# Patient Record
Sex: FEMALE | Race: ASIAN | NOT HISPANIC OR LATINO | Employment: FULL TIME
[De-identification: names, ages, dates, MRNs, and addresses within clinical notes are randomized per-mention and may not be internally consistent; named-entity substitution may affect disease eponyms.]

---

## 2017-08-12 ENCOUNTER — HEALTH MAINTENANCE LETTER (OUTPATIENT)
Age: 47
End: 2017-08-12

## 2021-05-28 ENCOUNTER — RECORDS - HEALTHEAST (OUTPATIENT)
Dept: ADMINISTRATIVE | Facility: CLINIC | Age: 51
End: 2021-05-28

## 2024-03-04 ENCOUNTER — HOSPITAL ENCOUNTER (EMERGENCY)
Facility: HOSPITAL | Age: 54
Discharge: HOME | End: 2024-03-05
Attending: EMERGENCY MEDICINE | Admitting: EMERGENCY MEDICINE
Payer: COMMERCIAL

## 2024-03-04 DIAGNOSIS — F43.20 ADJUSTMENT DISORDER, UNSPECIFIED TYPE: ICD-10-CM

## 2024-03-04 LAB
ALBUMIN SERPL BCG-MCNC: 3.9 G/DL (ref 3.5–5.2)
ALP SERPL-CCNC: 98 U/L (ref 40–150)
ALT SERPL W P-5'-P-CCNC: 16 U/L (ref 0–50)
ANION GAP SERPL CALCULATED.3IONS-SCNC: 9 MMOL/L (ref 7–15)
APAP SERPL-MCNC: <5 UG/ML (ref 10–30)
AST SERPL W P-5'-P-CCNC: 19 U/L (ref 0–45)
BASOPHILS # BLD AUTO: 0 10E3/UL (ref 0–0.2)
BASOPHILS NFR BLD AUTO: 0 %
BILIRUB DIRECT SERPL-MCNC: <0.2 MG/DL (ref 0–0.3)
BILIRUB SERPL-MCNC: 0.4 MG/DL
BUN SERPL-MCNC: 9.8 MG/DL (ref 6–20)
CALCIUM SERPL-MCNC: 8.7 MG/DL (ref 8.6–10)
CHLORIDE SERPL-SCNC: 106 MMOL/L (ref 98–107)
CREAT SERPL-MCNC: 0.46 MG/DL (ref 0.51–0.95)
DEPRECATED HCO3 PLAS-SCNC: 25 MMOL/L (ref 22–29)
EGFRCR SERPLBLD CKD-EPI 2021: >90 ML/MIN/1.73M2
EOSINOPHIL # BLD AUTO: 0.1 10E3/UL (ref 0–0.7)
EOSINOPHIL NFR BLD AUTO: 1 %
ERYTHROCYTE [DISTWIDTH] IN BLOOD BY AUTOMATED COUNT: 12.3 % (ref 10–15)
ETHANOL SERPL-MCNC: <0.01 G/DL
GLUCOSE SERPL-MCNC: 109 MG/DL (ref 70–99)
HCT VFR BLD AUTO: 41.9 % (ref 35–47)
HGB BLD-MCNC: 13.2 G/DL (ref 11.7–15.7)
IMM GRANULOCYTES # BLD: 0 10E3/UL
IMM GRANULOCYTES NFR BLD: 0 %
INR PPP: 1.05 (ref 0.85–1.15)
LYMPHOCYTES # BLD AUTO: 1.5 10E3/UL (ref 0.8–5.3)
LYMPHOCYTES NFR BLD AUTO: 25 %
MCH RBC QN AUTO: 26.9 PG (ref 26.5–33)
MCHC RBC AUTO-ENTMCNC: 31.5 G/DL (ref 31.5–36.5)
MCV RBC AUTO: 86 FL (ref 78–100)
MONOCYTES # BLD AUTO: 0.4 10E3/UL (ref 0–1.3)
MONOCYTES NFR BLD AUTO: 7 %
NEUTROPHILS # BLD AUTO: 4 10E3/UL (ref 1.6–8.3)
NEUTROPHILS NFR BLD AUTO: 67 %
NRBC # BLD AUTO: 0 10E3/UL
NRBC BLD AUTO-RTO: 0 /100
PLATELET # BLD AUTO: 264 10E3/UL (ref 150–450)
POTASSIUM SERPL-SCNC: 3.5 MMOL/L (ref 3.4–5.3)
PROT SERPL-MCNC: 7.3 G/DL (ref 6.4–8.3)
RBC # BLD AUTO: 4.9 10E6/UL (ref 3.8–5.2)
SODIUM SERPL-SCNC: 140 MMOL/L (ref 135–145)
WBC # BLD AUTO: 6 10E3/UL (ref 4–11)

## 2024-03-04 PROCEDURE — 80076 HEPATIC FUNCTION PANEL: CPT | Performed by: EMERGENCY MEDICINE

## 2024-03-04 PROCEDURE — 82077 ASSAY SPEC XCP UR&BREATH IA: CPT | Performed by: EMERGENCY MEDICINE

## 2024-03-04 PROCEDURE — 85025 COMPLETE CBC W/AUTO DIFF WBC: CPT | Performed by: EMERGENCY MEDICINE

## 2024-03-04 PROCEDURE — 36415 COLL VENOUS BLD VENIPUNCTURE: CPT | Performed by: EMERGENCY MEDICINE

## 2024-03-04 PROCEDURE — 80143 DRUG ASSAY ACETAMINOPHEN: CPT | Performed by: EMERGENCY MEDICINE

## 2024-03-04 PROCEDURE — 99283 EMERGENCY DEPT VISIT LOW MDM: CPT

## 2024-03-04 PROCEDURE — 85610 PROTHROMBIN TIME: CPT | Performed by: EMERGENCY MEDICINE

## 2024-03-04 ASSESSMENT — ACTIVITIES OF DAILY LIVING (ADL)
ADLS_ACUITY_SCORE: 33
ADLS_ACUITY_SCORE: 35

## 2024-03-04 ASSESSMENT — COLUMBIA-SUICIDE SEVERITY RATING SCALE - C-SSRS
2. HAVE YOU ACTUALLY HAD ANY THOUGHTS OF KILLING YOURSELF IN THE PAST MONTH?: NO
1. IN THE PAST MONTH, HAVE YOU WISHED YOU WERE DEAD OR WISHED YOU COULD GO TO SLEEP AND NOT WAKE UP?: NO
6. HAVE YOU EVER DONE ANYTHING, STARTED TO DO ANYTHING, OR PREPARED TO DO ANYTHING TO END YOUR LIFE?: NO

## 2024-03-05 ENCOUNTER — PATIENT OUTREACH (OUTPATIENT)
Dept: CARE COORDINATION | Facility: CLINIC | Age: 54
End: 2024-03-05
Payer: COMMERCIAL

## 2024-03-05 VITALS
BODY MASS INDEX: 30.62 KG/M2 | RESPIRATION RATE: 16 BRPM | DIASTOLIC BLOOD PRESSURE: 86 MMHG | HEART RATE: 63 BPM | SYSTOLIC BLOOD PRESSURE: 136 MMHG | TEMPERATURE: 97.7 F | WEIGHT: 144 LBS | OXYGEN SATURATION: 98 %

## 2024-03-05 PROBLEM — F43.20 ADJUSTMENT DISORDER: Status: ACTIVE | Noted: 2024-03-05

## 2024-03-05 NOTE — ED PROVIDER NOTES
EMERGENCY DEPARTMENT NOTE     Name: Ppee Graham    Age/Sex: 53 year old female   MRN: [id number]   Evaluation Date & Time:  3/4/2024  9:48 PM    PCP:    Carol Steen   ED Provider: Henrry Beal D.O.       CHIEF COMPLAINT    possible tylenol overdose       DIAGNOSIS & DISPOSITION/MEDICAL DECISION MAKING   No diagnosis found.    Pepe Graham is a 53 year old female with relevant past history of headache who presents to the emergency department for evaluation of possible Tylenol overdose.      Medical Decision Making  Patient on exam is afebrile with stable vital signs.  Exam is otherwise within normal limits including abdominal exam which was nontender.  Laboratory evaluation showed nondetectable acetaminophen level and comprehensive metabolic profile, CBC and INR within normal limits.  DEC  is interviewed the patient.  She has had increasing stress both with relationship with her  and was reported lost her job today adding to it.  Patient does not endorse any suicidal ideation or intent.  Event today was felt to be more of a cry for help.  After discussion with the family a safety plan  Discussed and they were comfortable with discharge and they will stay with the patient and monitor any medication use.  Patient is given resources for outpatient mental health services and will also see her primary care physician this week.  Return criteria were discussed and if the patient is feeling overwhelmed or develops any thoughts of self-harm or return to the emergency department.    Interventions:None  Discharge Vital Signs:BP (!) 147/95   Pulse 61   Temp 97.7  F (36.5  C)   Resp 16   Wt 65.3 kg (144 lb)   LMP 11/01/2013   SpO2 99%   BMI 30.62 kg/m       DISPOSITION: Discharge.    Diagnostic studies:  Imaging:  No orders to display      Lab:  Labs Ordered and Resulted from Time of ED Arrival to Time of ED Departure   BASIC METABOLIC PANEL - Abnormal       Result Value    Sodium 140      Potassium 3.5   "    Chloride 106      Carbon Dioxide (CO2) 25      Anion Gap 9      Urea Nitrogen 9.8      Creatinine 0.46 (*)     GFR Estimate >90      Calcium 8.7      Glucose 109 (*)    ACETAMINOPHEN LEVEL - Abnormal    Acetaminophen <5.0 (*)    HEPATIC FUNCTION PANEL - Normal    Protein Total 7.3      Albumin 3.9      Bilirubin Total 0.4      Alkaline Phosphatase 98      AST 19      ALT 16      Bilirubin Direct <0.20     INR - Normal    INR 1.05     ETHYL ALCOHOL LEVEL - Normal    Alcohol ethyl <0.01     CBC WITH PLATELETS AND DIFFERENTIAL    WBC Count 6.0      RBC Count 4.90      Hemoglobin 13.2      Hematocrit 41.9      MCV 86      MCH 26.9      MCHC 31.5      RDW 12.3      Platelet Count 264      % Neutrophils 67      % Lymphocytes 25      % Monocytes 7      % Eosinophils 1      % Basophils 0      % Immature Granulocytes 0      NRBCs per 100 WBC 0      Absolute Neutrophils 4.0      Absolute Lymphocytes 1.5      Absolute Monocytes 0.4      Absolute Eosinophils 0.1      Absolute Basophils 0.0      Absolute Immature Granulocytes 0.0      Absolute NRBCs 0.0                 Triage note reviewed:Pt states taking 3-5 tylenol for a headache today.  Pt's daughter states she found a note stating ,\"don't save me.\" There were also some text messages between daughter and mother that were concerning for SI.  Pt denies any suicidal ideation currently.  Pt's daughter seems to think that patient took more tylenol then she is admitting to.            History:  Supplemental history from: Family Member/Significant Other  External Record(s) reviewed: Most recent available primary care offices every 29 2016 reviewed    Work Up:  Chart documentation includes differential considered and any EKGs or imaging independently interpreted by provider, where specified.  In additional to work up documented, I considered the following work up: None    External consultation:  Discussion of management with another provider: Other: DEC    Complicating " factors:  Care impacted by chronic illness: NA  Care affected by social determinants of health: Access to medical care    Disposition considerations: Discharge. No recommendations on prescription strength medication(s). See documentation for any additional details.    At the conclusion of the encounter I discussed the results of all of the tests and the disposition. The questions were answered. The patient or family acknowledged understanding and was agreeable with the care plan.    TOTAL CRITICAL CARE TIME (EXCLUDING PROCEDURES): Not applicable    PROCEDURES:   None    EMERGENCY DEPARTMENT COURSE   10:19 PM I met with the patient to gather history and to perform my initial exam.  We discussed treatment options and the plan for care while in the Emergency Department, including DEC assessment. Patient and her family are amenable.  11:30 PM Discussed patient with DEC . The patient has had ongoing stress at home with her . She was fired today. The DEC  agrees that today's event was more likely a cry for help than a suicide gesture. They discussed with the patient's family and the patient did not want to pursue out-patient psychiatric service. Her family will take information and try to get her into counseling and therapy. They will stay with her and ensure that she doesn't have access to medications. Safety plan in place for the patient.  11:38 PM Rechecked patient. Discussed plans for discharge, patient and family. She is requesting a work note.    ED INTERVENTIONS   Medications - No data to display    DISCHARGE MEDICATIONS        Review of your medicines        UNREVIEWED medicines. Ask your doctor about these medicines        Dose / Directions   calcium carbonate 500 MG chewable tablet  Commonly known as: TUMS  Used for: Heartburn      Dose: 1 chew tab  Take 1 tablet (500 mg) by mouth 3 times daily as needed for heartburn  Quantity: 90 tablet  Refills: 3     Dextromethorphan-guaiFENesin   MG/5ML Liqd  Used for: Post-viral cough syndrome      Dose: 10 mL  Take 10 mLs by mouth every 4 hours as needed (cough)  Quantity: 236 mL  Refills: 1     docusate sodium 100 MG tablet  Commonly known as: COLACE  Used for: Constipation, unspecified constipation type      Dose: 100 mg  Take 100 mg by mouth 2 times daily as needed for constipation  Quantity: 60 tablet  Refills: 1     guaiFENesin-codeine 100-10 MG/5ML solution  Commonly known as: ROBITUSSIN AC  Used for: Cough      Dose: 2 tsp.  Take 10 mLs by mouth every 6 hours as needed for cough  Quantity: 120 mL  Refills: 0     ibuprofen 600 MG tablet  Commonly known as: ADVIL/MOTRIN  Used for: Headache(784.0)      Dose: 600 mg  Take 1 tablet (600 mg) by mouth every 6 hours as needed for moderate pain  Quantity: 120 tablet  Refills: 0     TYLENOL PO  Used for: Cough      Dose: 4 tablet  Take 4 tablets by mouth 4 tabs once a day  Refills: 0                INFORMATION SOURCE AND LIMITATIONS    History/Exam limitations: None  Patient information was obtained from: Patient and her daughter  Use of : N/A    HISTORY OF PRESENT ILLNESS   Pepe Graham is a 53 year old year old female with a relevant past history of headache, who presents to this ED by walk in for evaluation of possible Tylenol overdose.    The patient states that she took 3-5 Tylenol today. Her family is concerned about possible suicidal gesture. The patient denies current suicidal ideation. She states that she was feeling frustrated with work and family earlier today.    She denies vomiting or abdominal pain.    REVIEW OF SYSTEMS:   All other systems reviewed and are negative except as noted above in HPI.    PATIENT HISTORY     Past Medical History:   Diagnosis Date    No pertinent past medical history      Patient Active Problem List   Diagnosis    Health Care Home    Headache    Female stress incontinence     Past Surgical History:   Procedure Laterality Date    NO HISTORY OF SURGERY         No  Known Allergies    OUTPATIENT MEDICATIONS     New Prescriptions    No medications on file      Vitals:    03/04/24 2138 03/04/24 2253   BP: (!) 150/96 (!) 147/95   Pulse: 61 61   Resp: 16    Temp: 97.7  F (36.5  C)    SpO2: 99% 99%   Weight: 65.3 kg (144 lb)        Physical Exam   Constitutional: Oriented to person, place, and time. Appears well-developed and well-nourished.   Cardiovascular: Normal rate, regular rhythm and normal heart sounds.    Pulmonary/Chest: Normal effort  and breath sounds normal.   Abdominal: Soft. Bowel sounds are normal.   Neurological: Alert and oriented to person, place, and time. Normal strength. Skin: Skin is warm and dry.   Psychiatric: Normal mood and affect. Behavior is normal. Thought content normal.     DIAGNOSTICS    LABORATORY FINDINGS (REVIEWED AND INTERPRETED):  Labs Ordered and Resulted from Time of ED Arrival to Time of ED Departure   BASIC METABOLIC PANEL - Abnormal       Result Value    Sodium 140      Potassium 3.5      Chloride 106      Carbon Dioxide (CO2) 25      Anion Gap 9      Urea Nitrogen 9.8      Creatinine 0.46 (*)     GFR Estimate >90      Calcium 8.7      Glucose 109 (*)    ACETAMINOPHEN LEVEL - Abnormal    Acetaminophen <5.0 (*)    HEPATIC FUNCTION PANEL - Normal    Protein Total 7.3      Albumin 3.9      Bilirubin Total 0.4      Alkaline Phosphatase 98      AST 19      ALT 16      Bilirubin Direct <0.20     INR - Normal    INR 1.05     ETHYL ALCOHOL LEVEL - Normal    Alcohol ethyl <0.01     CBC WITH PLATELETS AND DIFFERENTIAL    WBC Count 6.0      RBC Count 4.90      Hemoglobin 13.2      Hematocrit 41.9      MCV 86      MCH 26.9      MCHC 31.5      RDW 12.3      Platelet Count 264      % Neutrophils 67      % Lymphocytes 25      % Monocytes 7      % Eosinophils 1      % Basophils 0      % Immature Granulocytes 0      NRBCs per 100 WBC 0      Absolute Neutrophils 4.0      Absolute Lymphocytes 1.5      Absolute Monocytes 0.4      Absolute Eosinophils 0.1       Absolute Basophils 0.0      Absolute Immature Granulocytes 0.0      Absolute NRBCs 0.0           IMAGING (REVIEWED AND INTERPRETED):  No orders to display         I, Juan Miguel Davis, am serving as a scribe to document services personally performed by Henrry Beal D.O., based on my observation and the provider s statements to me.    I, Henrry Beal D.O., attest that Juan Miguel Davis is acting in a scribe capacity, has observed my performance of the services and has documented them in accordance with my direction.    Henrry Beal D.O.  EMERGENCY MEDICINE   03/04/24  [facility] EMERGENCY DEPARTMENT  [address] [place] [id number] [phone]  Dept: [phone]     Henrry Beal DO  03/04/24 9202

## 2024-03-05 NOTE — ED TRIAGE NOTES
"Pt states taking 3-5 tylenol for a headache today.  Pt's daughter states she found a note stating ,\"don't save me.\" There were also some text messages between daughter and mother that were concerning for SI.  Pt denies any suicidal ideation currently.  Pt's daughter seems to think that patient took more tylenol then she is admitting to.        "

## 2024-03-05 NOTE — PROGRESS NOTES
Clinic Care Coordination Contact  Follow Up Progress Note      Assessment: The pt was recently in the ED, I called to check up on the pt and help the pt setup a ED follow up. The pt was at [facility] for possibly tylenol overdose. I called and talked to the pt, pt stated that she is doing better. She did not feel that she needs a follow up.     Care Gaps:    Health Maintenance Due   Topic Date Due    YEARLY PREVENTIVE VISIT  Never done    ADVANCE CARE PLANNING  Never done    MAMMO SCREENING  Never done    COLORECTAL CANCER SCREENING  Never done    HIV SCREENING  Never done    HEPATITIS C SCREENING  Never done    HEPATITIS B IMMUNIZATION (1 of 3 - 19+ 3-dose series) Never done    DTAP/TDAP/TD IMMUNIZATION (1 - Tdap) 02/22/2008    LIPID  Never done    PAP  12/30/2016    ZOSTER IMMUNIZATION (1 of 2) Never done    INFLUENZA VACCINE (1) Never done    COVID-19 Vaccine (1 - 2023-24 season) Never done    PHQ-2 (once per calendar year)  01/01/2024           Care Plans      Intervention/Education provided during outreach:               Plan:     Care Coordinator will follow up in

## 2024-03-05 NOTE — CONSULTS
"Diagnostic Evaluation Consultation  Crisis Assessment    Patient Name: Pepe Graham  Age:  53 year old  Legal Sex: female  Gender Identity: female  Pronouns:   Race:   Ethnicity: Not  or   Language: English      Patient was assessed: Virtual: [facility] Crisis Assessment Start Time: 2255 Crisis Assessment Stop Time: 2325  Patient location: [facility] EMERGENCY DEPARTMENT                                 Referral Data and Chief Complaint  Pepe Graham presents to the ED with family/friends. Patient is presenting to the ED for the following concerns: Suicidal ideation, Depression.   Factors that make the mental health crisis life threatening or complex are:  Pt presents for suicide ideation and concerns that she attemtped to kill herself. Pt reports she has been stressed lately due to a recent demotion at work and a previous \"friend that is a girl has been calling him.\" Pt reports she struggled with sleep yesterday and had a headache when she got home today. Pt reports she got upset about the other woman, thinking she and her  were having an affair. Pt reports her  denied this. Pt reports due to her headache, she took \"a few ibuprofen, not a lot, like 3-5 pills\" to help with her headache. Pt reports she has been emotional today due to the stress and lack of sleep. Pt reports she was having some thoughts of hurting herself and reports she left a note saying, \"don't save me.\" Pt is unclear about whether this was a suicide attempt or not. Pt reports, \"I didn't mean to kill myself, I would've taken a whole bottle. I wrote the note just because I was pissed about the issues. I wasn't trying to kill myself.\" Pt reports she believes this was an act of \"wanting more attention from my , a cry for help.\" Pt denies current SI/SIB/SA/HI and denies plans, means, or intent to harm herself or others. Pt denies hallucinations and delusions. Pt presents as oriented, engaged, and " anxious..      Informed Consent and Assessment Methods  Explained the crisis assessment process, including applicable information disclosures and limits to confidentiality, assessed understanding of the process, and obtained consent to proceed with the assessment.  Assessment methods included conducting a formal interview with patient, review of medical records, collaboration with medical staff, and obtaining relevant collateral information from family and community providers when available.  : done     Patient response to interventions: acceptance expressed, verbalizes understanding  Coping skills were attempted to reduce the crisis:  talked to family     History of the Crisis   Pt denies historical nor current mental health or chemical health issues, including: hospitalizations, inpatient stays, programmatic care, treatment, therapy, or medication management. Pt denies history of SI/SIB/SA/HI and denies plans, means, or intent to harm himself or others. Pt denies historical hallucinations or delusions.    Brief Psychosocial History  Family:  , Children yes  Support System:  Children  Employment Status:  employed full-time  Source of Income:  salary/wages  Financial Environmental Concerns:  none  Current Hobbies:  outdoor activities, family functions, care of plants, gardening  Barriers in Personal Life:  lack of time    Significant Clinical History  Current Anxiety Symptoms:  anxious  Current Depression/Trauma:  thoughts of death/suicide  Current Somatic Symptoms:     Current Psychosis/Thought Disturbance:     Current Eating Symptoms:     Chemical Use History:  Alcohol: None  Benzodiazepines: None  Opiates: None  Cocaine: None  Marijuana: None  Other Use: None   Past diagnosis:  No known past diagnosis  Family history:  No known history of mental health or chemical health concerns  Past treatment:  No known formal treatment attempts  Details of most recent treatment:  Pt denies current nor historical  "involvement in mental health services.  Other relevant history:  Pt reports she lives at home with her  and youngest daughter.       Collateral Information  Is there collateral information: Yes     Collateral information name, relationship, phone number:  NOEL BOLES (Daughter) -- -- [phone]    What happened today: Reports her little sister was dropped off at her home today and was saying pt was acting, \"weird\". Reports she went to pt house and observed her being in bed, sobbing, and mumbling.     What is different about patient's functioning: Reports pt appears more tired and exhausted than normal and believes pt struggles to navigate her feelings. Reports pt has never acted like this before and is not sure what pt \"true intentions were behind taking the ibuprofen, \"but it also could have been to harm herself.\"     Concern about alcohol/drug use:      What do you think the patient needs:      Has patient made comments about wanting to kill themselves/others: yes    If d/c is recommended, can they take part in safety/aftercare planning:  yes (reports able to be a support for mother.)    Additional collateral information:        Risk Assessment  [place] Suicide Severity Rating Scale Full Clinical Version:  Suicidal Ideation  Q1 Wish to be Dead (Lifetime): Yes  Q2 Non-Specific Active Suicidal Thoughts (Lifetime): Yes  3. Active Suicidal Ideation with any Methods (Not Plan) Without Intent to Act (Lifetime): No  Q4 Active Suicidal Ideation with Some Intent to Act, Without Specific Plan (Lifetime): No  Q5 Active Suicidal Ideation with Specific Plan and Intent (Lifetime): No  Q6 Suicide Behavior (Lifetime): no     Suicidal Behavior (Lifetime)  Actual Attempt (Lifetime): No  Has subject engaged in non-suicidal self-injurious behavior? (Lifetime): No  Interrupted Attempts (Lifetime): No  Aborted or Self-Interrupted Attempt (Lifetime): No  Preparatory Acts or Behavior (Lifetime): Yes  Total Number of " "Preparatory Acts (Lifetime): 1  Preparatory Acts or Behavior Description (Lifetime): writing a note that says, \"don't save me\"    [place] Suicide Severity Rating Scale Recent:   Suicidal Ideation (Recent)  Q1 Wished to be Dead (Past Month): yes  Q2 Suicidal Thoughts (Past Month): yes  Q3 Suicidal Thought Method: no  Q4 Suicidal Intent without Specific Plan: no  Q5 Suicide Intent with Specific Plan: no  Level of Risk per Screen: low risk  Intensity of Ideation (Recent)  Most Severe Ideation Rating (Past 1 Month): 2  Frequency (Past 1 Month): Less than once a week  Duration (Past 1 Month): Less than 1 hour/some of the time  Controllability (Past 1 Month): Can control thoughts with little difficulty  Deterrents (Past 1 Month): Deterrents definitely stopped you from attempting suicide  Reasons for Ideation (Past 1 Month): Completely to get attention, revenge, or a reaction from others  Suicidal Behavior (Recent)  Actual Attempt (Past 3 Months): No  Has subject engaged in non-suicidal self-injurious behavior? (Past 3 Months): No  Interrupted Attempts (Past 3 Months): No  Aborted or Self-Interrupted Attempt (Past 3 Months): No  Preparatory Acts or Behavior (Past 3 Months): Yes  Total Number of Preparatory Acts (Past 3 Months): 1  Preparatory Acts or Behavior Description (Past 3 Months): writing a note that says, \"don't save me\"    Environmental or Psychosocial Events: work or task failure, challenging interpersonal relationships  Protective Factors: Protective Factors: strong bond to family unit, community support, or employment, responsibilities and duties to others, including pets and children, lives in a responsibly safe and stable environment, able to access care without barriers, constructive use of leisure time, enjoyable activities, resilience    Does the patient have thoughts of harming others? Feels Like Hurting Others: no  Previous Attempt to Hurt Others: no  Is the patient engaging in sexually inappropriate " "behavior?: no    Is the patient engaging in sexually inappropriate behavior?  no        Mental Status Exam   Affect: Appropriate  Appearance: Appropriate  Attention Span/Concentration: Attentive  Eye Contact: Engaged    Fund of Knowledge: Appropriate   Language /Speech Content: Fluent  Language /Speech Volume: Normal  Language /Speech Rate/Productions: Normal  Recent Memory: Intact  Remote Memory: Intact  Mood: Anxious  Orientation to Person: Yes   Orientation to Place: Yes  Orientation to Time of Day: Yes  Orientation to Date: Yes     Situation (Do they understand why they are here?): Yes  Psychomotor Behavior: Normal  Thought Content: Clear  Thought Form: Intact     Mini-Cog Assessment  Number of Words Recalled:    Clock-Drawing Test:     Three Item Recall:    Mini-Cog Total Score:       Medication  Psychotropic medications:   Medication Orders - Psychiatric (From admission, onward)      None             Current Care Team  Patient Care Team:  Carol Steen MD as PCP - General (Family Practice)    Diagnosis  Patient Active Problem List   Diagnosis Code    Health Care Home Z76.89    Headache R51.9    Female stress incontinence N39.3    Adjustment disorder F43.20       Primary Problem This Admission  Active Hospital Problems    *Adjustment disorder        Clinical Summary and Substantiation of Recommendations   After therapeutic assessment, intervention and aftercare planning by ED care team and LM and in consultation with attending provider, the patient's circumstances and mental state were appropriate for outpatient management. It is the recommendation of this clinician that pt discharge with OP MH support. A this time the pt is not presenting as an acute risk to self or others due to the following factors: Pt presents to ED after concerns for pt being suicidal after she wrote a note saying, \"don't save me,\" and taking 3-5 ibuprofen for a headache. Pt reports this was a cry for help and attention due to " her struggling marriage and wanting more attention from him. Pt denies this being a suicide attempt. Pt denies current SI/SIB/SA/HI and denies plans, means, or intent to harm herself or others. Pt declines to be scheduled for services and her daughter reports she can restrict pt access to medications and support pt at home with help of other family members.                          Patient coping skills attempted to reduce the crisis:  talked to family    Disposition  Recommended disposition: Individual Therapy, Medication Management        Reviewed case and recommendations with attending provider. Attending Name: Dr. Beal       Attending concurs with disposition: yes       Patient and/or validated legal guardian concurs with disposition:   yes       Final disposition:  discharge    Legal status on admission:      Assessment Details   Total duration spent with the patient: 30 min     CPT code(s) utilized: 91072 - Psychotherapy for Crisis - 60 (30-74*) min    Shanna Allen, [facility], LADC, Psychotherapist  DEC - Triage & Transition Services  Callback: [phone]

## 2024-03-05 NOTE — DISCHARGE INSTRUCTIONS
Thankfully your laboratory evaluation including her acetaminophen level were within normal limits and there does not appear to be a toxic ingestion.  See your primary care physician follow-up this week.  To the emergency department if you are feeling overwhelmed or have any thoughts of self-harm.      Aftercare Plan  If I am feeling unsafe or I am in a crisis, I will:   Contact my established care providers   Call the National Suicide Prevention Lifeline: 630.920.6937   Go to the nearest emergency room   Call [phone]     Warning signs that I or other people might notice when a crisis is developing for me:     I am having increasing suicidal thoughts that turn to plans with intent or means  I am having additional urges to self-harm    My emotions are of hopelessness; feeling like there's no way out.  Rage or anger.  Engaging in risky activities without thinking  Withdrawing from family/friends  Dramatic mood swings  Drastic personality changes   Use of alcohol or drugs  Postings on social media  Neglect of personal hygiene or cares     Things I am able to do on my own to cope or help me feel better:    Spending quality time with loved ones  Staying hydrated  Eating balanced meals  Going for a walk every day  Take care of daily responsibilities/needs  Focus on positive self-talk vs negative self-talk    Things that I am able to do with others to cope or help me better:   Music  Deep breathing  Meditations  Journal  Self-regulate  Self check-in  Ask for help  Exercise    Things I can use or do for distraction:   Reach out to/spend time with family, friends  Shower  Exercise  Chores or do a project  Listen to music  Watch movie/TV  Listening to music  Journaling  Reading a book  Meditating  Call a friend    Changes I can make to support my mental health and wellness:    -I will abstain from all mood altering chemicals not currently prescribed to me   -I will attend scheduled mental health therapy and psychiatric  appointments and follow all   recommendations  -I will commit to 30 minutes of self care daily - this can be as simple as taking a shower, going for a walk, cooking a meal, read, writing, etc  -I will practice square breathing when I begin to feel anxious - in breath through the nose for the count   of 4 and the first line on the square. Out breath through the mouth for the count of 4 for the second line   of the square. Repeat to complete the square. Repeat the square as many times as needed.  - I will use distraction skills of: going for walks, watching TV, spending time outside, calling a friend or family member  -Use community resources, including [employer] numbers, [facility] crisis and support meetings  -Maintain a daily schedule/routine  -Practice deep breathing skills  -Download a meditation milton and spend 15-20 minutes per day mediating/relaxing. Some apps to   download include: Calm, Headspace and Insight Timer. All 3 of these apps have free version    Reduce Extreme Emotion  QUICKLY:  Changing Your Body Chemistry      T:  Change your body Temperature to change your autonomic nervous system   Use Ice Water to calm yourself down FAST   Put your face in a bowl of ice water (this is the best way; have the person keep his/her face in ice water for 30-45 seconds - initial research is showing that the longer s/he can hold her/his face in the water, the better the response), or   Splash ice water on your face, or hold an ice pack on your face      I:  Intensely exercise to calm down a body revved up by emotion   Examples: running, walking fast, jumping, playing basketball, weight lifting, swimming, calisthenics, etc.   Engage in exercises that DO NOT include violent behaviors. Exercises that utilize violent behaviors tend to function as  behavioral rehearsal,  and rather than calming the person down, may actually  rev  the person up more, increasing the likelihood of violence, and lessening the likelihood that they will   burn off  energy     P:  Progressively relax your muscles   Starting with your hands, moving to your forearms, upper arms, shoulders, neck, forehead, eyes, cheeks and lips, tongue and teeth, chest, upper back, stomach, buttocks, thighs, calves, ankles, feet   Tense (10 seconds,   of the way), then relax each muscle (all the way)   Notice the tension   Notice the difference when relaxed (by tensing first, and then relaxing, you are able to get a more thorough relaxation than by simply relaxing)      P: Paced breathing to relax   The standard technique is to begin with counting the number of steps one takes for a typical inhale, then counting the steps one takes for a typical exhale, and then lengthening the amount of steps for the exhalation by one or two steps.  OR  Repeat this pattern for 1-2 minutes  Inhale for four (4) seconds   Exhale for six (6) to eight (8) seconds   Research demonstrated that one can change one's overall level of anxiety by doing this exercise for even a few minutes per day      People in my life that I can ask for help:   Family  Friends  Providers    Your [facility] has a mental health crisis team you can call 24/7:   [facility] Crisis Line Number: [phone]  [facility] Mental Health Crisis: [phone] - Call the crisis line for immediate mental health support, 24 hours a day.   [facility] Crisis Line Number: [phone]  [facility] Crisis Line Number: [phone]  [facility] Crisis Line Number: [phone]   [facility] Crisis Line Number: [phone]  [place]: [phone]  [place]: [phone]  [facility] Crisis Number: [phone]  [facility] Crisis: [phone]  [facility] Crisis: [phone]  [facility] Crisis: [phone]    Other things that are important when I'm in crisis:   Ask for help    Additional resources and information:     Mental Health Apps  My3   "https://myMint Labspp.org/    VirtualHopeBox  https://Uvinum/apps/virtual-hope-box/       Professionals or Agencies I Can Contact During A Crisis:       Crisis Lines  Call or Text 958 - National Suicide and Crisis Lifeline    Crisis Text Line  Text 205577  You will be connected with a trained live crisis counselor to provide support.    [employer] (LGBTQ Youth Crisis Line)  1.940.919.3867  text START to 117-332    National [place] on Mental Illness (MANDI)  719.662.4305 or 4.543.MANDI.HELPS    National Suicide Prevention Lifeline at 9-114-716-JHWC (5266)     Throughout  [place]: call **CRISIS (**172127)     Crisis Text Line: is available for free, 24/7 by texting [place] to 800869    [employer]  Fast Tracker  Linking people to mental health and substance use disorder resources  Qoof.org     [place] Mental Health Warm Line  Peer to peer support  Monday thru Saturday, 12 pm to 10 pm  649.664.9667 or 4.141.127.1488  Text \"Support\" to 17677     National [place] on Mental Illness (www.[place].mandi.org): 654.576.4273 or 378-859-4675     [facility] Phone (free remote counseling): [phone] Web address:   https://Vamosa.org/     www.Kiala (filter for insurance, gender preference, etc.)    [facility]   [phone]  Intake appointment will be virtual, following appointments can be in person or virtual.   **IMMEDIATE OPENINGS**    [facility]  [phone]  *offers individual therapy, medication management and Mental Health Case Workers; can self refer    [facility]  [phone]  *Immediate Openings    [facility]  [phone]  *Immediate Openings    [facility]  [phone]  *Immediate Openings    Please follow up with scheduled providers to ensure all necessary paperwork is filled out prior to your   scheduled telehealth appointments.     Coordinators from Behavioral Healthcare " Providers will be calling within two business days to ensure   that you have the resources you may need or provide assistance with scheduling (Phone number: [phone].).    Remember: give the referrals 3 sessions prior to calling it quits. Do you trust them? Do you feel   understood? Do you think they can help? Check in with yourself after each session    Please reach out to the [facility]([phone]) regarding further mental health appointment needs for this emergency department visit.    [facility] SCHEDULING:  Today you were seen by a licensed mental health professional through [facility] sevices, [facility] ([facility])  for a crisis assessment in the Emergency Department at [facility].  It is recommended that you follow up with your estabished providers (psychiatrist, menta health therapist, and/or primary care doctor - as relevant) as soon as possible. Coordinators from [facility] will be calling you in the next 24-48 hours to ensure that you have the resources you need.  You can so contact [facility] coordinators directly at [phone].     [facility] maintains an extensive network of licensed behavioral health providers to connect patients with the services they need.  We do not charge providers a fee to participate in our referral network.  We match patients with providers based on a patient s specific needs, insurance coverage, and location.  Our first effort will be to refer you to a provider within your care system, and will utilize providers outside your care system as needed.

## 2024-03-28 ENCOUNTER — OFFICE VISIT (OUTPATIENT)
Dept: FAMILY MEDICINE | Facility: CLINIC | Age: 54
End: 2024-03-28
Payer: COMMERCIAL

## 2024-03-28 VITALS
OXYGEN SATURATION: 97 % | WEIGHT: 143 LBS | RESPIRATION RATE: 19 BRPM | SYSTOLIC BLOOD PRESSURE: 99 MMHG | BODY MASS INDEX: 30.02 KG/M2 | HEIGHT: 58 IN | DIASTOLIC BLOOD PRESSURE: 72 MMHG | TEMPERATURE: 97.8 F | HEART RATE: 64 BPM

## 2024-03-28 DIAGNOSIS — Z12.11 SCREEN FOR COLON CANCER: ICD-10-CM

## 2024-03-28 DIAGNOSIS — Z00.00 ANNUAL PHYSICAL EXAM: ICD-10-CM

## 2024-03-28 DIAGNOSIS — Z00.00 ROUTINE GENERAL MEDICAL EXAMINATION AT A HEALTH CARE FACILITY: ICD-10-CM

## 2024-03-28 DIAGNOSIS — Z12.31 VISIT FOR SCREENING MAMMOGRAM: ICD-10-CM

## 2024-03-28 DIAGNOSIS — Z12.4 ENCOUNTER FOR SCREENING FOR CERVICAL CANCER: Primary | ICD-10-CM

## 2024-03-28 DIAGNOSIS — Z71.1 MENTAL HEALTH-RELATED COMPLAINT: ICD-10-CM

## 2024-03-28 DIAGNOSIS — Z11.59 NEED FOR HEPATITIS C SCREENING TEST: ICD-10-CM

## 2024-03-28 DIAGNOSIS — Z11.4 SCREENING FOR HIV (HUMAN IMMUNODEFICIENCY VIRUS): ICD-10-CM

## 2024-03-28 LAB — HBA1C MFR BLD: 5.9 % (ref 0–5.6)

## 2024-03-28 PROCEDURE — 90715 TDAP VACCINE 7 YRS/> IM: CPT

## 2024-03-28 PROCEDURE — 80061 LIPID PANEL: CPT

## 2024-03-28 PROCEDURE — 90471 IMMUNIZATION ADMIN: CPT

## 2024-03-28 PROCEDURE — 90472 IMMUNIZATION ADMIN EACH ADD: CPT

## 2024-03-28 PROCEDURE — G0145 SCR C/V CYTO,THINLAYER,RESCR: HCPCS

## 2024-03-28 PROCEDURE — 99386 PREV VISIT NEW AGE 40-64: CPT | Mod: 25

## 2024-03-28 PROCEDURE — 87624 HPV HI-RISK TYP POOLED RSLT: CPT

## 2024-03-28 PROCEDURE — 83036 HEMOGLOBIN GLYCOSYLATED A1C: CPT

## 2024-03-28 PROCEDURE — 87389 HIV-1 AG W/HIV-1&-2 AB AG IA: CPT

## 2024-03-28 PROCEDURE — 86803 HEPATITIS C AB TEST: CPT

## 2024-03-28 PROCEDURE — 36415 COLL VENOUS BLD VENIPUNCTURE: CPT

## 2024-03-28 PROCEDURE — 90746 HEPB VACCINE 3 DOSE ADULT IM: CPT

## 2024-03-28 SDOH — HEALTH STABILITY: PHYSICAL HEALTH: ON AVERAGE, HOW MANY DAYS PER WEEK DO YOU ENGAGE IN MODERATE TO STRENUOUS EXERCISE (LIKE A BRISK WALK)?: 1 DAY

## 2024-03-28 ASSESSMENT — SOCIAL DETERMINANTS OF HEALTH (SDOH): HOW OFTEN DO YOU GET TOGETHER WITH FRIENDS OR RELATIVES?: ONCE A WEEK
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
## 2024-03-28 NOTE — LETTER
[facility] [address] [place] [address]  Phone: [phone]  Fax: [phone]    March 28, 2024        Pepe Graham  [facility] [facility] [address]          To whom it may concern:    RE: Pepe Graham    This individual is my patient and was seen in clinic on 3/28/24. She was seen for a physical exam. Please excuse her from work on this day.     Please contact me for questions or concerns.      Sincerely,      Camelia Ghotra

## 2024-03-28 NOTE — LETTER
"April 11, 2024      Pepe Graham  [address] [place] [address]        Dear ,    We are writing to inform you of your test results.    \"Pepe -     Your lab work from your visit looks reassuring. Your pap smear returned without any concerning findings; I recommend repeating this in 5 years. Your cholesterol levels are also normal. You did have an elevated hemoglobin A1c, which suggests that you have pre-diabetes.     I would like you to come back to clinic and we can discuss this more. Options will include making some dietary changes or starting a medication.     Thank you,       Resulted Orders   Gynecologic Cytology (PAP)   Result Value Ref Range    Interpretation        Negative for Intraepithelial Lesion or Malignancy (NILM)    Comment         Papanicolaou Test Limitations:  Cervical cytology is a screening test with limited sensitivity, and regular screening is critical for cancer prevention.  Pap tests are primarily effective for the diagnosis/prevention of squamous cell carcinoma, not adenocarcinoma or other cancers.        Specimen Adequacy       Satisfactory for evaluation, endocervical/transformation zone component present    Clinical Information       none      Reflex Testing Yes regardless of result     Previous Abnormal?       No      Performing Labs       The technical component of this testing was completed at [facility]     HIV Screening   Result Value Ref Range    HIV Antigen Antibody Combo Nonreactive Nonreactive      Comment:      Negative HIV-1 p24 antigen and HIV-1/2 antibody screening test results usually indicate the absence of HIV-1 and HIV-2 infection. However, such negative results do not rule-out acute HIV infection.  If acute HIV-1 or HIV-2 infection is suspected, detection of HIV-1 or HIV-2 RNA  is recommended.    Hepatitis C Screen Reflex to HCV RNA Quant and Genotype   Result Value Ref Range    Hepatitis C " Antibody Nonreactive Nonreactive      Comment:      A nonreactive screening test result does not exclude the possibility of exposure to or infection with HCV. Nonreactive screening test results in individuals with prior exposure to HCV may be due to antibody levels below the limit of detection of this assay or lack of reactivity to the HCV antigens used in this assay. Patients with recent HCV infections (<3 months from time of exposure) may have false-negative HCV antibody results due to the time needed for seroconversion (average of 8 to 9 weeks).   Lipid panel reflex to direct LDL Non-fasting   Result Value Ref Range    Cholesterol 179 <200 mg/dL    Triglycerides 139 <150 mg/dL    Direct Measure HDL 50 >=50 mg/dL    LDL Cholesterol Calculated 101 (H) <=100 mg/dL    Non HDL Cholesterol 129 <130 mg/dL    Patient Fasting > 8hrs? Unknown     Narrative    Cholesterol  Desirable:  <200 mg/dL    Triglycerides  Normal:  Less than 150 mg/dL  Borderline High:  150-199 mg/dL  High:  200-499 mg/dL  Very High:  Greater than or equal to 500 mg/dL    Direct Measure HDL  Female:  Greater than or equal to 50 mg/dL   Male:  Greater than or equal to 40 mg/dL    LDL Cholesterol  Desirable:  <100mg/dL  Above Desirable:  100-129 mg/dL   Borderline High:  130-159 mg/dL   High:  160-189 mg/dL   Very High:  >= 190 mg/dL    Non HDL Cholesterol  Desirable:  130 mg/dL  Above Desirable:  130-159 mg/dL  Borderline High:  160-189 mg/dL  High:  190-219 mg/dL  Very High:  Greater than or equal to 220 mg/dL   Hemoglobin A1c   Result Value Ref Range    Hemoglobin A1C 5.9 (H) 0.0 - 5.6 %      Comment:      Normal <5.7%   Prediabetes 5.7-6.4%    Diabetes 6.5% or higher     Note: Adopted from ADA consensus guidelines.   HPV High Risk Types DNA Cervical   Result Value Ref Range    Other HR HPV Negative Negative    HPV16 DNA Negative Negative    HPV18 DNA Negative Negative    FINAL DIAGNOSIS       This patient's sample is negative for HPV  DNA.        This test was developed and its performance characteristics determined by the [facility]. It has not been cleared or approved by the FDA. The laboratory is regulated under CLIA as qualified to perform high-complexity testing. This test is used for clinical purposes. It should not be regarded as investigational or for research.    METHODOLOGY: The Roche Gaston 4800 system uses automated extraction, simultaneous amplification of HPV (L1 region) and beta-globin, followed by real time detection of fluorescent labeled HPV and beta globin using specific oligonucleotide probes. The test specifically identifies types HPV 16 DNA and HPV 18 DNA while concurrently detecting the rest of the high risk types (31, 33, 35, 39, 45, 51, 52, 56, 58, 59, 66 or 68).    COMMENTS: This test is not intended for use as a screening device for woman under age 30 with normal cervical cytology. Results should be correlated with cytologic and histologic findings. Close clinical followup is recommended.           If you have any questions or concerns, please call the clinic at the number listed above.       Sincerely,      Camelia Ghotra MD

## 2024-03-29 LAB
CHOLEST SERPL-MCNC: 179 MG/DL
FASTING STATUS PATIENT QL REPORTED: ABNORMAL
HCV AB SERPL QL IA: NONREACTIVE
HDLC SERPL-MCNC: 50 MG/DL
HIV 1+2 AB+HIV1 P24 AG SERPL QL IA: NONREACTIVE
LDLC SERPL CALC-MCNC: 101 MG/DL
NONHDLC SERPL-MCNC: 129 MG/DL
TRIGL SERPL-MCNC: 139 MG/DL

## 2024-04-02 LAB
BKR LAB AP GYN ADEQUACY: NORMAL
BKR LAB AP GYN INTERPRETATION: NORMAL
BKR LAB AP HPV REFLEX: NORMAL
BKR LAB AP PREVIOUS ABNORMAL: NORMAL
PATH REPORT.COMMENTS IMP SPEC: NORMAL
PATH REPORT.COMMENTS IMP SPEC: NORMAL
PATH REPORT.RELEVANT HX SPEC: NORMAL

## 2024-04-04 LAB
HUMAN PAPILLOMA VIRUS 16 DNA: NEGATIVE
HUMAN PAPILLOMA VIRUS 18 DNA: NEGATIVE
HUMAN PAPILLOMA VIRUS FINAL DIAGNOSIS: NORMAL
HUMAN PAPILLOMA VIRUS OTHER HR: NEGATIVE

## 2024-04-05 NOTE — PATIENT INSTRUCTIONS
Preventive Care Advice   This is general advice given by our system to help you stay healthy. However, your care team may have specific advice just for you. Please talk to your care team about your preventive care needs.  Nutrition  Eat 5 or more servings of fruits and vegetables each day.  Try wheat bread, brown rice and whole grain pasta (instead of white bread, rice, and pasta).  Get enough calcium and vitamin D. Check the label on foods and aim for 100% of the RDA (recommended daily allowance).  Lifestyle  Exercise at least 150 minutes each week   (30 minutes a day, 5 days a week).  Do muscle strengthening activities 2 days a week. These help control your weight and prevent disease.  No smoking.  Wear sunscreen to prevent skin cancer.  Have a dental exam and cleaning every 6 months.  Yearly exams  See your health care team every year to talk about:  Any changes in your health.  Any medicines your care team has prescribed.  Preventive care, family planning, and ways to prevent chronic diseases.  Shots (vaccines)   HPV shots (up to age 26), if you've never had them before.  Hepatitis B shots (up to age 59), if you've never had them before.  COVID-19 shot: Get this shot when it's due.  Flu shot: Get a flu shot every year.  Tetanus shot: Get a tetanus shot every 10 years.  Pneumococcal, hepatitis A, and RSV shots: Ask your care team if you need these based on your risk.  Shingles shot (for age 50 and up).  General health tests  Diabetes screening:  Starting at age 35, Get screened for diabetes at least every 3 years.  If you are younger than age 35, ask your care team if you should be screened for diabetes.  Cholesterol test: At age 39, start having a cholesterol test every 5 years, or more often if advised.  Bone density scan (DEXA): At age 50, ask your care team if you should have this scan for osteoporosis (brittle bones).  Hepatitis C: Get tested at least once in your life.  STIs (sexually transmitted  infections)  Before age 24: Ask your care team if you should be screened for STIs.  After age 24: Get screened for STIs if you're at risk. You are at risk for STIs (including HIV) if:  You are sexually active with more than one person.  You don't use condoms every time.  You or a partner was diagnosed with a sexually transmitted infection.  If you are at risk for HIV, ask about PrEP medicine to prevent HIV.  Get tested for HIV at least once in your life, whether you are at risk for HIV or not.  Cancer screening tests  Cervical cancer screening: If you have a cervix, begin getting regular cervical cancer screening tests at age 21. Most people who have regular screenings with normal results can stop after age 65. Talk about this with your provider.  Breast cancer scan (mammogram): If you've ever had breasts, begin having regular mammograms starting at age 40. This is a scan to check for breast cancer.  Colon cancer screening: It is important to start screening for colon cancer at age 45.  Have a colonoscopy test every 10 years (or more often if you're at risk) Or, ask your provider about stool tests like a FIT test every year or Cologuard test every 3 years.  To learn more about your testing options, visit: https://[email or web address]/410585.pdf.  For help making a decision, visit: https://bit.ly/yu83154.  Prostate cancer screening test: If you have a prostate and are age 55 to 69, ask your provider if you would benefit from a yearly prostate cancer screening test.  Lung cancer screening: If you are a current or former smoker age 50 to 80, ask your care team if ongoing lung cancer screenings are right for you.  For informational purposes only. Not to replace the advice of your health care provider. Copyright   2023 [employer]. All rights reserved. Clinically reviewed by the [facility] Transitions Program. [employer] [id number] - REV 01/24.

## 2024-04-05 NOTE — PROGRESS NOTES
Faculty Supervision of Residents   I have examined this patient and the medical care has been evaluated and discussed with the resident. See resident note outlining our discussion.    Mariama Gibbons MD

## 2025-03-27 ENCOUNTER — PATIENT OUTREACH (OUTPATIENT)
Dept: CARE COORDINATION | Facility: CLINIC | Age: 55
End: 2025-03-27
Payer: COMMERCIAL

## 2025-07-26 NOTE — Clinical Note
Pepe Graham was seen and treated in our emergency department on 3/4/2024.  She may return to work on 03/06/2024.       If you have any questions or concerns, please don't hesitate to call.      Henrry Beal, DO
yes